# Patient Record
Sex: MALE | Race: WHITE | NOT HISPANIC OR LATINO | Employment: OTHER | URBAN - METROPOLITAN AREA
[De-identification: names, ages, dates, MRNs, and addresses within clinical notes are randomized per-mention and may not be internally consistent; named-entity substitution may affect disease eponyms.]

---

## 2022-10-15 ENCOUNTER — HOSPITAL ENCOUNTER (INPATIENT)
Facility: HOSPITAL | Age: 82
LOS: 1 days | Discharge: HOME | DRG: 084 | End: 2022-10-16
Attending: STUDENT IN AN ORGANIZED HEALTH CARE EDUCATION/TRAINING PROGRAM | Admitting: SURGERY
Payer: MEDICARE

## 2022-10-15 DIAGNOSIS — S06.5XAA SDH (SUBDURAL HEMATOMA): ICD-10-CM

## 2022-10-15 DIAGNOSIS — S06.2X9A: ICD-10-CM

## 2022-10-15 DIAGNOSIS — S09.90XA INJURY OF HEAD, INITIAL ENCOUNTER: Primary | ICD-10-CM

## 2022-10-15 LAB
ALBUMIN SERPL-MCNC: 4.3 GM/DL (ref 3.4–4.8)
ALBUMIN/GLOB SERPL: 1.1 RATIO (ref 1.1–2)
ALP SERPL-CCNC: 95 UNIT/L (ref 40–150)
ALT SERPL-CCNC: 22 UNIT/L (ref 0–55)
APTT PPP: 30.1 SECONDS (ref 23.2–33.7)
AST SERPL-CCNC: 29 UNIT/L (ref 5–34)
BASOPHILS # BLD AUTO: 0.02 X10(3)/MCL (ref 0–0.2)
BASOPHILS NFR BLD AUTO: 0.2 %
BILIRUBIN DIRECT+TOT PNL SERPL-MCNC: 1.1 MG/DL
BUN SERPL-MCNC: 22 MG/DL (ref 8.4–25.7)
CALCIUM SERPL-MCNC: 10.6 MG/DL (ref 8.8–10)
CHLORIDE SERPL-SCNC: 105 MMOL/L (ref 98–107)
CO2 SERPL-SCNC: 22 MMOL/L (ref 23–31)
CREAT SERPL-MCNC: 1.09 MG/DL (ref 0.73–1.18)
EOSINOPHIL # BLD AUTO: 0 X10(3)/MCL (ref 0–0.9)
EOSINOPHIL NFR BLD AUTO: 0 %
ERYTHROCYTE [DISTWIDTH] IN BLOOD BY AUTOMATED COUNT: 13.9 % (ref 11.5–17)
GFR SERPLBLD CREATININE-BSD FMLA CKD-EPI: >60 MLS/MIN/1.73/M2
GLOBULIN SER-MCNC: 3.9 GM/DL (ref 2.4–3.5)
GLUCOSE SERPL-MCNC: 149 MG/DL (ref 82–115)
HCT VFR BLD AUTO: 42.9 % (ref 42–52)
HGB BLD-MCNC: 14.2 GM/DL (ref 14–18)
IMM GRANULOCYTES # BLD AUTO: 0.03 X10(3)/MCL (ref 0–0.04)
IMM GRANULOCYTES NFR BLD AUTO: 0.3 %
INR BLD: 0.98 (ref 0–1.3)
LYMPHOCYTES # BLD AUTO: 0.55 X10(3)/MCL (ref 0.6–4.6)
LYMPHOCYTES NFR BLD AUTO: 5.5 %
MCH RBC QN AUTO: 32.6 PG (ref 27–31)
MCHC RBC AUTO-ENTMCNC: 33.1 MG/DL (ref 33–36)
MCV RBC AUTO: 98.6 FL (ref 80–94)
MONOCYTES # BLD AUTO: 0.58 X10(3)/MCL (ref 0.1–1.3)
MONOCYTES NFR BLD AUTO: 5.8 %
NEUTROPHILS # BLD AUTO: 8.8 X10(3)/MCL (ref 2.1–9.2)
NEUTROPHILS NFR BLD AUTO: 88.2 %
NRBC BLD AUTO-RTO: 0 %
PLATELET # BLD AUTO: 254 X10(3)/MCL (ref 130–400)
PMV BLD AUTO: 11.1 FL (ref 7.4–10.4)
POTASSIUM SERPL-SCNC: 4.5 MMOL/L (ref 3.5–5.1)
PROT SERPL-MCNC: 8.2 GM/DL (ref 5.8–7.6)
PROTHROMBIN TIME: 12.9 SECONDS (ref 12.5–14.5)
RBC # BLD AUTO: 4.35 X10(6)/MCL (ref 4.7–6.1)
SODIUM SERPL-SCNC: 140 MMOL/L (ref 136–145)
WBC # SPEC AUTO: 10 X10(3)/MCL (ref 4.5–11.5)

## 2022-10-15 PROCEDURE — 85025 COMPLETE CBC W/AUTO DIFF WBC: CPT | Performed by: STUDENT IN AN ORGANIZED HEALTH CARE EDUCATION/TRAINING PROGRAM

## 2022-10-15 PROCEDURE — 20000000 HC ICU ROOM

## 2022-10-15 PROCEDURE — 85610 PROTHROMBIN TIME: CPT | Performed by: STUDENT IN AN ORGANIZED HEALTH CARE EDUCATION/TRAINING PROGRAM

## 2022-10-15 PROCEDURE — 96374 THER/PROPH/DIAG INJ IV PUSH: CPT

## 2022-10-15 PROCEDURE — 25000003 PHARM REV CODE 250: Performed by: STUDENT IN AN ORGANIZED HEALTH CARE EDUCATION/TRAINING PROGRAM

## 2022-10-15 PROCEDURE — 85730 THROMBOPLASTIN TIME PARTIAL: CPT | Performed by: STUDENT IN AN ORGANIZED HEALTH CARE EDUCATION/TRAINING PROGRAM

## 2022-10-15 PROCEDURE — 99291 CRITICAL CARE FIRST HOUR: CPT | Mod: 25

## 2022-10-15 PROCEDURE — 80053 COMPREHEN METABOLIC PANEL: CPT | Performed by: STUDENT IN AN ORGANIZED HEALTH CARE EDUCATION/TRAINING PROGRAM

## 2022-10-15 PROCEDURE — 63600175 PHARM REV CODE 636 W HCPCS: Performed by: STUDENT IN AN ORGANIZED HEALTH CARE EDUCATION/TRAINING PROGRAM

## 2022-10-15 PROCEDURE — 36415 COLL VENOUS BLD VENIPUNCTURE: CPT | Performed by: STUDENT IN AN ORGANIZED HEALTH CARE EDUCATION/TRAINING PROGRAM

## 2022-10-15 RX ORDER — ASPIRIN 81 MG/1
81 TABLET ORAL
Status: ON HOLD | COMMUNITY
End: 2022-10-16 | Stop reason: SDUPTHER

## 2022-10-15 RX ORDER — ALLOPURINOL 300 MG/1
TABLET ORAL
COMMUNITY
Start: 2022-09-09

## 2022-10-15 RX ORDER — AMLODIPINE BESYLATE 5 MG/1
10 TABLET ORAL DAILY
Status: DISCONTINUED | OUTPATIENT
Start: 2022-10-16 | End: 2022-10-16 | Stop reason: HOSPADM

## 2022-10-15 RX ORDER — BENAZEPRIL HYDROCHLORIDE 40 MG/1
40 TABLET ORAL DAILY
COMMUNITY
Start: 2022-09-02

## 2022-10-15 RX ORDER — ATORVASTATIN CALCIUM 40 MG/1
40 TABLET, FILM COATED ORAL DAILY
Status: DISCONTINUED | OUTPATIENT
Start: 2022-10-16 | End: 2022-10-16 | Stop reason: HOSPADM

## 2022-10-15 RX ORDER — LABETALOL HYDROCHLORIDE 5 MG/ML
20 INJECTION, SOLUTION INTRAVENOUS
Status: COMPLETED | OUTPATIENT
Start: 2022-10-15 | End: 2022-10-15

## 2022-10-15 RX ORDER — FERROUS SULFATE, DRIED 160(50) MG
1 TABLET, EXTENDED RELEASE ORAL DAILY
COMMUNITY

## 2022-10-15 RX ORDER — INDOMETHACIN 25 MG/1
50 CAPSULE ORAL
COMMUNITY

## 2022-10-15 RX ORDER — LEVETIRACETAM 500 MG/1
500 TABLET ORAL 2 TIMES DAILY
Status: DISCONTINUED | OUTPATIENT
Start: 2022-10-16 | End: 2022-10-16 | Stop reason: HOSPADM

## 2022-10-15 RX ORDER — AMLODIPINE BESYLATE 10 MG/1
10 TABLET ORAL DAILY
COMMUNITY
Start: 2022-09-02

## 2022-10-15 RX ORDER — LEVETIRACETAM 10 MG/ML
1000 INJECTION INTRAVASCULAR ONCE
Status: COMPLETED | OUTPATIENT
Start: 2022-10-15 | End: 2022-10-15

## 2022-10-15 RX ORDER — LABETALOL HYDROCHLORIDE 5 MG/ML
10 INJECTION, SOLUTION INTRAVENOUS EVERY 6 HOURS PRN
Status: DISCONTINUED | OUTPATIENT
Start: 2022-10-15 | End: 2022-10-16 | Stop reason: HOSPADM

## 2022-10-15 RX ORDER — HYDRALAZINE HYDROCHLORIDE 20 MG/ML
10 INJECTION INTRAMUSCULAR; INTRAVENOUS EVERY 6 HOURS PRN
Status: DISCONTINUED | OUTPATIENT
Start: 2022-10-15 | End: 2022-10-16 | Stop reason: HOSPADM

## 2022-10-15 RX ORDER — ALLOPURINOL 100 MG/1
300 TABLET ORAL DAILY
Status: DISCONTINUED | OUTPATIENT
Start: 2022-10-16 | End: 2022-10-16 | Stop reason: HOSPADM

## 2022-10-15 RX ORDER — SODIUM CHLORIDE 0.9 % (FLUSH) 0.9 %
10 SYRINGE (ML) INJECTION
Status: DISCONTINUED | OUTPATIENT
Start: 2022-10-15 | End: 2022-10-16 | Stop reason: HOSPADM

## 2022-10-15 RX ORDER — LISINOPRIL 20 MG/1
20 TABLET ORAL DAILY
Status: DISCONTINUED | OUTPATIENT
Start: 2022-10-16 | End: 2022-10-16 | Stop reason: HOSPADM

## 2022-10-15 RX ORDER — FAMOTIDINE 20 MG/1
20 TABLET, FILM COATED ORAL 2 TIMES DAILY
Status: DISCONTINUED | OUTPATIENT
Start: 2022-10-15 | End: 2022-10-16 | Stop reason: HOSPADM

## 2022-10-15 RX ORDER — ONDANSETRON 4 MG/1
8 TABLET, ORALLY DISINTEGRATING ORAL EVERY 8 HOURS PRN
Status: DISCONTINUED | OUTPATIENT
Start: 2022-10-15 | End: 2022-10-16 | Stop reason: HOSPADM

## 2022-10-15 RX ORDER — ATORVASTATIN CALCIUM 40 MG/1
40 TABLET, FILM COATED ORAL DAILY
COMMUNITY
Start: 2022-09-02

## 2022-10-15 RX ORDER — ACETAMINOPHEN 325 MG/1
650 TABLET ORAL EVERY 4 HOURS PRN
Status: DISCONTINUED | OUTPATIENT
Start: 2022-10-15 | End: 2022-10-16 | Stop reason: HOSPADM

## 2022-10-15 RX ORDER — OXYCODONE HYDROCHLORIDE 5 MG/1
10 TABLET ORAL EVERY 4 HOURS PRN
Status: DISCONTINUED | OUTPATIENT
Start: 2022-10-15 | End: 2022-10-16 | Stop reason: HOSPADM

## 2022-10-15 RX ORDER — OXYCODONE HYDROCHLORIDE 5 MG/1
5 TABLET ORAL EVERY 4 HOURS PRN
Status: DISCONTINUED | OUTPATIENT
Start: 2022-10-15 | End: 2022-10-16 | Stop reason: HOSPADM

## 2022-10-15 RX ORDER — TALC
6 POWDER (GRAM) TOPICAL NIGHTLY PRN
Status: DISCONTINUED | OUTPATIENT
Start: 2022-10-15 | End: 2022-10-16 | Stop reason: HOSPADM

## 2022-10-15 RX ADMIN — LABETALOL HYDROCHLORIDE 20 MG: 5 INJECTION, SOLUTION INTRAVENOUS at 08:10

## 2022-10-15 RX ADMIN — FAMOTIDINE 20 MG: 20 TABLET, FILM COATED ORAL at 09:10

## 2022-10-15 RX ADMIN — LEVETIRACETAM 1000 MG: 10 INJECTION INTRAVENOUS at 09:10

## 2022-10-16 VITALS
HEIGHT: 66 IN | BODY MASS INDEX: 30.72 KG/M2 | RESPIRATION RATE: 14 BRPM | HEART RATE: 76 BPM | TEMPERATURE: 98 F | OXYGEN SATURATION: 95 % | WEIGHT: 191.13 LBS | SYSTOLIC BLOOD PRESSURE: 141 MMHG | DIASTOLIC BLOOD PRESSURE: 63 MMHG

## 2022-10-16 PROBLEM — S06.5XAA SDH (SUBDURAL HEMATOMA): Status: ACTIVE | Noted: 2022-10-16

## 2022-10-16 LAB
ANION GAP SERPL CALC-SCNC: 13 MEQ/L
BASOPHILS # BLD AUTO: 0.01 X10(3)/MCL (ref 0–0.2)
BASOPHILS NFR BLD AUTO: 0.1 %
BUN SERPL-MCNC: 23.3 MG/DL (ref 8.4–25.7)
CALCIUM SERPL-MCNC: 9.8 MG/DL (ref 8.8–10)
CHLORIDE SERPL-SCNC: 103 MMOL/L (ref 98–107)
CO2 SERPL-SCNC: 22 MMOL/L (ref 23–31)
CREAT SERPL-MCNC: 1 MG/DL (ref 0.73–1.18)
CREAT/UREA NIT SERPL: 23
EOSINOPHIL # BLD AUTO: 0 X10(3)/MCL (ref 0–0.9)
EOSINOPHIL NFR BLD AUTO: 0 %
ERYTHROCYTE [DISTWIDTH] IN BLOOD BY AUTOMATED COUNT: 13.8 % (ref 11.5–17)
GFR SERPLBLD CREATININE-BSD FMLA CKD-EPI: >60 MLS/MIN/1.73/M2
GLUCOSE SERPL-MCNC: 118 MG/DL (ref 82–115)
HCT VFR BLD AUTO: 39.2 % (ref 42–52)
HGB BLD-MCNC: 13 GM/DL (ref 14–18)
IMM GRANULOCYTES # BLD AUTO: 0.03 X10(3)/MCL (ref 0–0.04)
IMM GRANULOCYTES NFR BLD AUTO: 0.4 %
LYMPHOCYTES # BLD AUTO: 0.68 X10(3)/MCL (ref 0.6–4.6)
LYMPHOCYTES NFR BLD AUTO: 8 %
MCH RBC QN AUTO: 33 PG (ref 27–31)
MCHC RBC AUTO-ENTMCNC: 33.2 MG/DL (ref 33–36)
MCV RBC AUTO: 99.5 FL (ref 80–94)
MONOCYTES # BLD AUTO: 0.77 X10(3)/MCL (ref 0.1–1.3)
MONOCYTES NFR BLD AUTO: 9.1 %
NEUTROPHILS # BLD AUTO: 7 X10(3)/MCL (ref 2.1–9.2)
NEUTROPHILS NFR BLD AUTO: 82.4 %
NRBC BLD AUTO-RTO: 0 %
PLATELET # BLD AUTO: 220 X10(3)/MCL (ref 130–400)
PMV BLD AUTO: 11.4 FL (ref 7.4–10.4)
POTASSIUM SERPL-SCNC: 4.6 MMOL/L (ref 3.5–5.1)
RBC # BLD AUTO: 3.94 X10(6)/MCL (ref 4.7–6.1)
SODIUM SERPL-SCNC: 138 MMOL/L (ref 136–145)
WBC # SPEC AUTO: 8.5 X10(3)/MCL (ref 4.5–11.5)

## 2022-10-16 PROCEDURE — 25000003 PHARM REV CODE 250: Performed by: STUDENT IN AN ORGANIZED HEALTH CARE EDUCATION/TRAINING PROGRAM

## 2022-10-16 PROCEDURE — 36415 COLL VENOUS BLD VENIPUNCTURE: CPT | Performed by: STUDENT IN AN ORGANIZED HEALTH CARE EDUCATION/TRAINING PROGRAM

## 2022-10-16 PROCEDURE — 85025 COMPLETE CBC W/AUTO DIFF WBC: CPT | Performed by: STUDENT IN AN ORGANIZED HEALTH CARE EDUCATION/TRAINING PROGRAM

## 2022-10-16 PROCEDURE — 80048 BASIC METABOLIC PNL TOTAL CA: CPT | Performed by: STUDENT IN AN ORGANIZED HEALTH CARE EDUCATION/TRAINING PROGRAM

## 2022-10-16 RX ORDER — ASPIRIN 81 MG/1
81 TABLET ORAL DAILY
Refills: 0 | COMMUNITY
Start: 2022-10-23

## 2022-10-16 RX ADMIN — LEVETIRACETAM 500 MG: 500 TABLET, FILM COATED ORAL at 08:10

## 2022-10-16 RX ADMIN — AMLODIPINE BESYLATE 10 MG: 5 TABLET ORAL at 08:10

## 2022-10-16 RX ADMIN — ATORVASTATIN CALCIUM 40 MG: 40 TABLET, FILM COATED ORAL at 08:10

## 2022-10-16 RX ADMIN — LISINOPRIL 20 MG: 20 TABLET ORAL at 08:10

## 2022-10-16 RX ADMIN — FAMOTIDINE 20 MG: 20 TABLET, FILM COATED ORAL at 08:10

## 2022-10-16 RX ADMIN — ALLOPURINOL 300 MG: 100 TABLET ORAL at 08:10

## 2022-10-16 NOTE — DISCHARGE SUMMARY
[facility] - [facility] [address] [facility]  General Surgery  Discharge Summary      Patient Name: Lazaro Saeed  MRN: [id number]  Admission Date: 10/15/2022  Hospital Length of Stay: 1 days  Discharge Date and Time:  10/16/2022 12:31 PM  Attending Physician: Joe Martinez Jr., MD   Discharging Provider: Marii Hoffman MD  Primary Care Provider: No primary care provider on file.     HPI: 81M with hx HTN, HLD, gout, prostate CA presented as a transfer from OSH after fall from 6 foot ladder.     * No surgery found *     Hospital Course: Patient admitted to TICU 10/15 for close monitoring of SDH seen on CT in ED. Patient was cleared for discharge by neurosurgery on 10/16. He is neurologically intact with no headache. Discharged with no need for follow up. Told to avoid ASA for 7 days.    Consults:   Consults (From admission, onward)          Status Ordering Provider     Inpatient consult to Neurosurgery  Once        Provider:  Bhupinder Hernandez MD    Completed JOE MARTINEZ JR            Significant Diagnostic Studies: Radiology: CT scan:  CT Head    Pending Diagnostic Studies:       None          Final Active Diagnoses:    Diagnosis Date Noted POA    PRINCIPAL PROBLEM:  SDH (subdural hematoma) [S06.5XAA] 10/16/2022 Unknown      Problems Resolved During this Admission:      Discharged Condition: good    Disposition: Home or Self Care    Follow Up:    Patient Instructions:      Notify your health care provider if you experience any of the following:  temperature >100.4     Notify your health care provider if you experience any of the following:  persistent nausea and vomiting or diarrhea     Notify your health care provider if you experience any of the following:  severe uncontrolled pain     Medications:  Reconciled Home Medications:      Medication List        CHANGE how you take these medications      aspirin 81 MG EC tablet  Commonly known as: ECOTRIN  Take 1 tablet (81 mg total) by mouth once daily.  Start taking  on: October 23, 2022  What changed:   when to take this  These instructions start on October 23, 2022. If you are unsure what to do until then, ask your doctor or other care provider.            CONTINUE taking these medications      allopurinoL 300 MG tablet  Commonly known as: ZYLOPRIM     amLODIPine 10 MG tablet  Commonly known as: NORVASC  Take 10 mg by mouth once daily.     atorvastatin 40 MG tablet  Commonly known as: LIPITOR  Take 40 mg by mouth once daily.     benazepriL 40 MG tablet  Commonly known as: LOTENSIN  Take 40 mg by mouth once daily.     calcium-vitamin D3 500 mg-5 mcg (200 unit) per tablet  Commonly known as: OS-THANG 500 + D3  Take 1 tablet by mouth once daily.     indomethacin 25 MG capsule  Commonly known as: INDOCIN  Take 50 mg by mouth as needed.     Lactobacillus acidophilus 1 billion cell Cap  Take 1 tablet by mouth once daily.     multivitamin capsule  Take 1 capsule by mouth once daily.              Marii Hoffman MD  General Surgery  [facility] - [address] [place] [address] ICU

## 2022-10-16 NOTE — H&P
Trauma/Acute Care Surgery  Inpatient Consult     Subjective:     CC: Fall from ladder    HPI: Lazaro Saeed is a 80 yo M who presented to [facility] as a transfer from an OSH, where he initially presented for evaluation after he fell off of a 6 foot ladder. He does not remember having any dizzines, lightheadedness, or palpitation prior to the floor. He thinks the ladder buckled underneath him. He denies LOC, but does not remember the whole accident. He does not remember how he landed. He has been ambulatory since the accident. He has had a GCS of 15 since the accident. He is currently c/o head and L knee pain.      PMH: HTN, HLD, gout, prostate cancer s/p XRT  PSH: Prostatectomy, retinal reattachment, cataract extraction  Home Meds: Norvasc, benazepril, Lipitor, ASA, allopurinol  Allergies: NKDA   Social Hx: Never smoker, 2 EtOH drinks/day, denies illicit drug use  Relevant Family Hx: None     Objective:     Vitals:  BP: (!) 188/94   Pulse: 108   Resp: (!) 22   Temp: 97.5 °F (36.4 °C)      Physical Exam:  Gen: NAD  Neuro: awake, alert, answering questions appropriately  HEENT: NCAT  CV: RRR  Resp: non-labored breathing, stabl on 2L O2 via NC  Abd: soft, ND, NT  Ext: moves all 4 spontaneously and purposefully  Skin: warm, well perfused     Labs:  WBC 10.0  Hgb 14.2  Plt 254  INR 0.98  Cr 1.09  AST/ALT 29/22  Glucose 149     Imaging:  OSH CTH:   Focal area of intraparenchymal hemorrhage in the Sylvian fissure anteriorly and inferiorly adjacent to the temporal lobe.   Questionable SDH overlying the R frontal region, although this may be artifact.    OSH CT C-Spine:   Severe multilevel degenerative changes.    No evidence of acute fracture, subluxation, or facet dislocation.     Assessment/Plan:  Lazaro Saeed is a 80 yo M s/p fall form 6 ft w/ an acute SDH.     Neuro:  - NSGY consulted for intracranial hemorrhages. Will f/u recs.   - Q1 neuro checks.   - Repeat CTH in AM, sooner if any neuro changes develop.   - Keppra x7d for  seizure PPX. Seizure precautions.   - PRN analgesia available.     CV:  - SBP goal <140 per NSGY. Currently above goal. Restart home Novasc, ACE (-). PRN hydralazine and labetalol IV pushes available. If still not maintaining goal w/ PRNs, will start Cardene gtt.     Resp:  - Wean supplemental O2 to maintain sats >92%. Encourage IS, deep breathing, coughing.     FEN/GI:  - NPO until stable CTH.   - mIVFs (NS @ 125 cc/hr) and H2B for stress ulcer PPX while NPO.   - Replace lytes PRN based on daily labs.     Renal:  - Cr WNL.   - Monitor UOP.     Heme:  - Hgb and Plt WNL. No concern for bleeding or indication for transfusion.    - SCDs for DVT PPX. Holding chemoprophylaxis until cleared by NSGY in setting of intracranial hemorrhages.    ID:  - AF. WBC WNL. No concern for infection or indication for antibiotics at this time.    Endo:  - No known h/o DM. BG goal 100-180. Currently w/ no insulin requirement CTM.     MSK:  - C/o L knee pain. Will obtain XR to r/o fx.  - H/o gout. Continue home allopurinol.   - OOB, ambulation as tolerated.     Dispo:  - Admit to TICU.     Alexis Scheuermann, MD   [facility] General Surgery, PGY4  10/15/2022 8:42 PM

## 2022-10-16 NOTE — TERTIARY TRAUMA SURVEY NOTE
TERTIARY TRAUMA SURVEY     HPI/Brief Hospital Course: Lazaro Saeed is a 82 yo M who presented to [facility] as a transfer from an OSH, where he initially presented for evaluation after falling from a 6 foot ladder. He does not remember having any dizzines, lightheadedness, or palpitations prior to the fall. He denies LOC, but does not remember the whole accident. Imaging workup revealed intracranial hemorrhages, details listed below. Per NSGY, he was admitted to the TICU for close monitoring. He has remained oriented and neurologically intact since presentation.     Injuries Identified to Date:  Intraparenchymal hemorrhagic contusions  Intraventricular hemorrhage    Operations and Procedures:  None    Past Medical/Surgical History:  HTN (on Norvasc, benazepril)  HLD (on Lipitor)  Gout (an allopurinol)  Prostate cancer s/p XRT and prostatectomy  Retinal re-attachment  Cataract extraction    Physical Exam:  BP: 135/71   Pulse: 85   Resp: 15   Temp: 98.3 °F (36.8 °C)      Gen: NAD  Neuro: awake, alert, answering questions appropriately  HEENT: NCAT  CV: RRR  Resp: non-labored breathing, stable on 2L O2 via NC  Abd: soft, ND, NT  : deferred  Rectal: deferred  Ext: moves all 4 spontaneously and purposefully w/ 5/5 strength throughout BUE and BLE  Skin: warm, well perfused    Imaging Review:  OSH CTH:   Focal area of intraparenchymal hemorrhage in the Sylvian fissure anteriorly and inferiorly adjacent to the temporal lobe.   Questionable SDH overlying the R frontal region, although this may be artifact.     OSH CT C-Spine:   Severe multilevel degenerative changes.    No evidence of acute fracture, subluxation, or facet dislocation.    XR L Knee: Osteoarthritis. No fracture or dislocation.     Repeat CTH (Preliminary):   Stable intraparenchymal hemorrhage.  Interval development of intraventricular hemorrhage in the occipital horns of the lateral ventricles.     Lab Review:  WBC 8.5  Hgb 13.0  Plt 220  INR 0.98  Cr  1.00  AST/ALT 29/22  Glucose 118    Assessment/Plan:  Lazaro Saeed is a 80 yo M s/p fall form 6 ft w/ an acute SDH.      Neuro:  - Repeat CTH reviewed this AM. New IVH appreciated. Patient aware. Remains GCS 15 w/ no neuro deficits.  - NSGY consulted for eval of intracranial hemorrhages. Will f/u recs.   - For now, continue Q1 neuro checks.    - Keppra x7d for seizure PPX. Seizure precautions.   - PRN analgesia available.      CV:  - SBP goal <140 per NSGY. Continue home Novasc, ACE (-). PRN hydralazine and labetalol IV pushes available. If still not maintaining goal w/ PRNs, will start Cardene gtt.      Resp:  - Wean supplemental O2 to maintain sats >92%. Encourage IS, deep breathing, coughing.      FEN/GI:  - NPO pending NSGY evaluation.  - mIVFs (NS @ 125 cc/hr) and H2B for stress ulcer PPX while NPO.   - Replace lytes PRN based on daily labs.      Renal:  - Voiding spontaneously. Cr WNL. No acute concerns.      Heme:  - Slight decrease in Hgb, not unexpected given IVF resuscitation. Plt WNL. No concern for bleeding or indication for transfusion.    - SCDs for DVT PPX. Holding chemoprophylaxis until cleared by NSGY in setting of intracranial hemorrhages.     ID:  - AF. WBC WNL. No concern for infection or indication for antibiotics at this time.     Endo:  - No known h/o DM. BG goal 100-180. Currently w/ no insulin requirement CTM.      MSK:  - H/o gout. Continue home allopurinol.   - OOB, ambulation as tolerated.      Dispo:  - Continue ICU level care until deemed stable for Q2 neuro checks by NSGY. No other ICU requirements at this time.     Alexis Scheuermann, MD   [facility] General Surgery, PGY4  10/16/2022 7:07 AM

## 2022-10-16 NOTE — ED PROVIDER NOTES
Encounter Date: 10/15/2022    SCRIBE #1 NOTE: I, Isaias Baez, am scribing for, and in the presence of,  Florencio Arvizu IV, MD. I have scribed the following portions of the note - Other sections scribed: HPI, ROS, PE.     History     Chief Complaint   Patient presents with    Head Injury     Transfer from [facility], Pt fell from ladder approx 5 ft,  head bleed. Pt is AAO x 4, no apparent distress.      82 y/o male with a history of HTN, HLD, and prostate cancer presents to the ED as a transfer from [facility] after falling 5ft off of a ladder and hitting the back of his head. Pt reports head pain and left knee pain but denies back pain.    Transfer summary: Pt was diagnosed with an intracranial hemorrhage after the fall. Per ER note from [facility], pt was alert and oriented x 3 and had a CGS of 15. The performed CT scan showed a 1.1x1.4 subdural hematoma. He was transferred to [facility] ED for neurosurgery services.    The history is provided by the patient and medical records. No  was used.   Head Injury   The incident occurred 3 to 5 hours ago. He came to the ER via by ambulance. The injury mechanism was a fall. There was no blood loss. The pain has been constant since the injury. Pertinent negatives include no vomiting and no weakness.   Review of patient's allergies indicates:  No Known Allergies  History reviewed. No pertinent past medical history.  History reviewed. No pertinent surgical history.  History reviewed. No pertinent family history.     Review of Systems   Constitutional:  Negative for chills and fever.   HENT:  Negative for congestion, rhinorrhea and sore throat.         Head pain   Eyes:  Negative for visual disturbance.   Respiratory:  Negative for cough and shortness of breath.    Cardiovascular:  Negative for chest pain.   Gastrointestinal:  Negative for abdominal pain, nausea and vomiting.   Genitourinary:  Negative for dysuria and hematuria.    Musculoskeletal:  Negative for joint swelling.        Left knee pain   Skin:  Negative for rash.   Neurological:  Negative for weakness.   Psychiatric/Behavioral:  Negative for confusion.    All other systems reviewed and are negative.    Physical Exam     Initial Vitals [10/15/22 1937]   BP Pulse Resp Temp SpO2   (!) 161/86 110 16 97.5 °F (36.4 °C) 98 %      MAP       --         Physical Exam    Nursing note and vitals reviewed.  Constitutional: He is not diaphoretic. No distress.   HENT:   Head: Normocephalic.   Hematoma to left posterior scalp   Eyes: EOM are normal. Pupils are equal, round, and reactive to light.   Neck: Neck supple.   Normal range of motion.  Cardiovascular:  Normal rate and regular rhythm.           No murmur heard.  Pulmonary/Chest: Breath sounds normal. No respiratory distress. He has no wheezes. He has no rales.   Abdominal: Abdomen is soft. He exhibits no distension. There is no abdominal tenderness.   Musculoskeletal:         General: No edema. Normal range of motion.      Cervical back: Normal range of motion and neck supple.     Neurological: He is alert and oriented to person, place, and time. He has normal strength. No cranial nerve deficit.   Skin: Skin is warm. No rash noted.   Psychiatric: He has a normal mood and affect.       ED Course   Critical Care    Date/Time: 10/15/2022 11:11 PM  Performed by: Florencio Arvizu IV, MD  Authorized by: Florencio Arvizu IV, MD   Total critical care time (exclusive of procedural time) : 35 minutes  Critical care time was exclusive of separately billable procedures and treating other patients.  Critical care was necessary to treat or prevent imminent or life-threatening deterioration of the following conditions: CNS failure or compromise.  Critical care was time spent personally by me on the following activities: blood draw for specimens, development of treatment plan with patient or surrogate, discussions with consultants, evaluation of patient's  response to treatment, examination of patient, obtaining history from patient or surrogate, ordering and performing treatments and interventions, ordering and review of laboratory studies, re-evaluation of patient's condition, pulse oximetry, ordering and review of radiographic studies and review of old charts.      Labs Reviewed   COMPREHENSIVE METABOLIC PANEL - Abnormal; Notable for the following components:       Result Value    Carbon Dioxide 22 (*)     Glucose Level 149 (*)     Calcium Level Total 10.6 (*)     Protein Total 8.2 (*)     Globulin 3.9 (*)     All other components within normal limits   CBC WITH DIFFERENTIAL - Abnormal; Notable for the following components:    RBC 4.35 (*)     MCV 98.6 (*)     MCH 32.6 (*)     MPV 11.1 (*)     Lymph # 0.55 (*)     All other components within normal limits   PROTIME-INR - Normal   APTT - Normal   CBC W/ AUTO DIFFERENTIAL    Narrative:     The following orders were created for panel order CBC auto differential.  Procedure                               Abnormality         Status                     ---------                               -----------         ------                     CBC with Differential[[id number]]        Abnormal            Final result                 Please view results for these tests on the individual orders.          Imaging Results              X-Ray Knee Complete 4 or More Views Left (Final result)  Result time 10/15/22 22:21:48   Procedure changed from X-Ray Knee 3 View Left     Final result by Baryan Borrero MD (10/15/22 22:21:48)                   Impression:      Moderate tricompartmental osteoarthritis of the left knee.      Electronically signed by: Brayan Borrero MD  Date:    10/15/2022  Time:    22:21               Narrative:    EXAMINATION:  Four radiographic views of the KNEE.    CLINICAL HISTORY:  Trauma;    TECHNIQUE:  Four radiographic views of the KNEE    COMPARISON:  None.    FINDINGS:  Four views of the left knee demonstrate  moderate tricompartmental osteoarthritis.  There is no fracture.  There is no joint effusion.  There is no soft tissue swelling.                                    X-Rays:   Independently Interpreted Readings:   Head CT: R small extraaxial hemorrhage    Medications   sodium chloride 0.9% flush 10 mL (has no administration in time range)   ondansetron disintegrating tablet 8 mg (has no administration in time range)   melatonin tablet 6 mg (has no administration in time range)   acetaminophen tablet 650 mg (has no administration in time range)   oxyCODONE immediate release tablet 5 mg (has no administration in time range)   oxyCODONE immediate release tablet 10 mg (has no administration in time range)   levETIRAcetam tablet 500 mg (has no administration in time range)   hydrALAZINE injection 10 mg (has no administration in time range)   labetaloL injection 10 mg (has no administration in time range)   famotidine tablet 20 mg (20 mg Oral Given 10/15/22 2152)   amLODIPine tablet 10 mg (has no administration in time range)   atorvastatin tablet 40 mg (has no administration in time range)   allopurinoL tablet 300 mg (has no administration in time range)   lisinopriL tablet 20 mg (has no administration in time range)   labetaloL injection 20 mg (20 mg Intravenous Given 10/15/22 2030)   levETIRAcetam in NaCl (iso-os) IVPB 1,000 mg (1,000 mg Intravenous New Bag 10/15/22 2152)     Medical Decision Making:   History:   Old Medical Records: I decided to obtain old medical records.  Old Records Summarized: records from another hospital.  Initial Assessment:   81-year-old transfer from outside hospital for right small traumatic ICH after fall 6 ft from a ladder - small extra-axial bleed and subdural hematoma on the right no midline shift.  Patient GCS 15 awake alert no neuro deficits on exam.  Slightly hypertensive not on any anticoagulation.  Discussed with nurse surgery will admit to trauma ICU blood pressure control Q 1 neuro  checks repeat CT head in the morning  Independently Interpreted Test(s):   I have ordered and independently interpreted X-rays - see prior notes.  Clinical Tests:   Lab Tests: Ordered and Reviewed  Radiological Study: Reviewed and Ordered  ED Management:  IV labetalol   Other:   I have discussed this case with another health care provider.       <> Summary of the Discussion: Neurosurgery Dr. Hernandez  Trauma surgery         Scribe Attestation:   Scribe #1: I performed the above scribed service and the documentation accurately describes the services I performed. I attest to the accuracy of the note.    Attending Attestation:           Physician Attestation for Scribe:  Physician Attestation Statement for Scribe #1: Florencio CALDERON IV, MD, reviewed documentation, as scribed by Isaias Baez in my presence, and it is both accurate and complete.           ED Course as of 10/15/22 2320   Sat Oct 15, 2022   2024 CT reviewed, will consult NSGY  [AC]   2024 Paged neurosurgery [JG]   2024 Paged trauma resident [JG]   2036 Trauma will admit the patient [JG]   2043 Neursurgery - recommends q1 neuro checks, BP<140/90, repeat head CT in am    [AC]      ED Course User Index  [AC] Florencio Arvizu IV, MD  [JG] Isaias Baez                 Clinical Impression:   Final diagnoses:  [S06.5XAA] SDH (subdural hematoma)  [S09.90XA] Injury of head, initial encounter (Primary)  [S06.2X9A] Intracranial hematoma following injury with loss of consciousness, initial encounter      ED Disposition Condition    Admit         Florencio CALDERON MD personally performed the history, PE, MDM, and procedures as documented above and agree with the scribe's documentation.           Florencio Arvizu IV, MD  10/15/22 6298

## 2022-10-16 NOTE — CONSULTS
Trauma/Acute Care Surgery  Inpatient Consult     Subjective:     CC: Fall from ladder    HPI: Lazaro Saeed is a 82 yo M who presented to [facility] as a transfer from an OSH, where he initially presented for evaluation after he fell off of a 6 foot ladder. He does not remember having any dizzines, lightheadedness, or palpitation prior to the floor. He thinks the ladder buckled underneath him. He denies LOC, but does not remember the whole accident. He does not remember how he landed. He has been ambulatory since the accident. He has had a GCS of 15 since the accident. He is currently c/o head and L knee pain.      PMH: HTN, HLD, gout, prostate cancer s/p XRT  PSH: Prostatectomy, retinal reattachment, cataract extraction  Home Meds: Norvasc, benazepril, Lipitor, ASA, allopurinol  Allergies: NKDA   Social Hx: Never smoker, 2 drinks/day, denies illicit drug use  Relevant Family Hx: None     Objective:     Vitals:  BP: (!) 188/94   Pulse: 108   Resp: (!) 22   Temp: 97.5 °F (36.4 °C)      Physical Exam:  Gen: NAD  Neuro: awake, alert, answering questions appropriately  HEENT: NCAT  CV: RRR  Resp: non-labored breathing, stabl on 2L O2 via NC  Abd: soft, ND, NT  Ext: moves all 4 spontaneously and purposefully  Skin: warm, well perfused     Labs:  WBC 10.0  Hgb 14.2  Plt 254  INR 0.98  Cr 1.09  AST/ALT 29/22  Glucose 149     Imaging:  OSH CTH:   Focal area of intraparenchymal hemorrhage in the Sylvian fissure anteriorly and inferiorly adjacent to the temporal lobe.   Questionable SDH overlying the R frontal region, although this may be artifact.    OSH CT C-Spine:   Severe multilevel degenerative changes.    No evidence of acute fracture, subluxation, or facet dislocation.     Assessment/Plan:  Lazaro Saeed is a 82 yo M s/p fall form 6 ft w/ an acute SDH. Plan to admit to TICU per NSGY. Full H&P w/ systems-based plan to follow.      Alexis Scheuermann, MD   [employer] General Surgery, PGY4  10/15/2022 8:42 PM

## 2022-10-16 NOTE — CONSULTS
[facility] - [facility] [place] [facility]  Neurosurgery  Consult Note    Inpatient consult to Neurosurgery  Consult performed by: ANTONIO Hernandez  Consult ordered by: Joe Martinez Jr., MD  Reason for consult: SDH      Subjective:     Chief Complaint/Reason for Admission: SDH    History of Present Illness: Patient is a 82 yo male with PMHx significant for HTN, HLD, gout, prostate CA, retinal re-attachment and cataract, who presented to [facility] on 10/15 as a transfer from an OSH, where he initially presented for evaluation after falling from a 6 foot ladder. He does not remember having any dizzines, lightheadedness, or palpitations prior to the fall. He denies LOC, but does not remember the whole accident. Imaging workup revealed SDH. Dr. Hernandez was consulted for evaluation and treatment recommendations. He was admitted to the TICU for close monitoring. He has remained oriented and neurologically intact since presentation.     On PE this am he is sitting up in bed, NAD. He currently denies HA, blurred vision and N/V. He is tolerating PO without issues and ambulating around the room.    PTA Medications   Medication Sig    allopurinoL (ZYLOPRIM) 300 MG tablet     amLODIPine (NORVASC) 10 MG tablet Take 10 mg by mouth once daily.    aspirin (ECOTRIN) 81 MG EC tablet Take 81 mg by mouth.    atorvastatin (LIPITOR) 40 MG tablet Take 40 mg by mouth once daily.    benazepriL (LOTENSIN) 40 MG tablet Take 40 mg by mouth once daily.    calcium-vitamin D3 (OS-THANG 500 + D3) 500 mg-5 mcg (200 unit) per tablet Take 1 tablet by mouth once daily.    indomethacin (INDOCIN) 25 MG capsule Take 50 mg by mouth as needed.    Lactobacillus acidophilus 1 billion cell Cap Take 1 tablet by mouth once daily.    multivitamin capsule Take 1 capsule by mouth once daily.       Review of patient's allergies indicates:  No Known Allergies    History reviewed. No pertinent past medical history.  History reviewed. No pertinent surgical  history.  Family History    None       Tobacco Use    Smoking status: Not on file    Smokeless tobacco: Not on file   Substance and Sexual Activity    Alcohol use: Not on file    Drug use: Not on file    Sexual activity: Not on file     Review of Systems  Objective:   12 pt ROS WNL, except for HPI    Weight: 86.7 kg (191 lb 2.2 oz)  Body mass index is 30.85 kg/m².  Vital Signs (Most Recent):  Temp: 98.1 °F (36.7 °C) (10/16/22 0800)  Pulse: 78 (10/16/22 1000)  Resp: 18 (10/16/22 1000)  BP: 112/75 (10/16/22 1000)  SpO2: 95 % (10/16/22 1000) Vital Signs (24h Range):  Temp:  [97.5 °F (36.4 °C)-98.3 °F (36.8 °C)] 98.1 °F (36.7 °C)  Pulse:  [] 78  Resp:  [12-28] 18  SpO2:  [94 %-99 %] 95 %  BP: (108-188)/(42-94) 112/75       Physical Exam:    Constitutional: He appears well-developed and well-nourished. No distress.     Eyes: Pupils are equal, round, and reactive to light. EOM are normal.     Cardiovascular: Normal rate, regular rhythm and intact distal pulses.     Abdominal: Soft. Bowel sounds are normal.     Psych/Behavior: He is alert. He is oriented to person, place, and time. He has a normal mood and affect.     Musculoskeletal:        Neck: Range of motion is full.        Back: Range of motion is full.        Right Upper Extremities: Range of motion is full. Muscle strength is 5/5.        Left Upper Extremities: Range of motion is full. Muscle strength is 5/5.       Right Lower Extremities: Range of motion is full. Muscle strength is 5/5.        Left Lower Extremities: Range of motion is full. Muscle strength is 5/5.     Neurological:        Cranial nerves: Cranial nerve(s) II, III, IV, V, VI, VII, VIII, IX, X, XI and XII are intact.     Significant Labs:  Recent Labs   Lab 10/15/22  2006 10/16/22  0135    138   K 4.5 4.6   CO2 22* 22*   BUN 22.0 23.3   CREATININE 1.09 1.00   CALCIUM 10.6* 9.8     Recent Labs   Lab 10/15/22  2006 10/16/22  0135   WBC 10.0 8.5   HGB 14.2 13.0*   HCT 42.9 39.2*     220     Recent Labs   Lab 10/15/22  2006   INR 0.98     Microbiology Results (last 7 days)       ** No results found for the last 168 hours. **            Significant Diagnostics:  CT Head Without Contrast [[id number]] Resulted: 10/16/22 0858   Order Status: Completed Updated: 10/16/22 0900   Narrative:     EXAMINATION:   CT HEAD WITHOUT CONTRAST     Technique: CT of the head was performed without intravenous contrast with axial as well as coronal and sagittal images.     Comparison: Comparison is with study dated 2022-10-15 12:33:32.     Dosage Information: Automated exposure control was utilized.     Clinical history: Recheck bleed.     Findings:     Hemorrhage: Again noted is a hemorrhagic contusion in the right presylvian cortex (series 2; image 18), which measures approximately 1.1 x 1 cm, unchanged since the prior examination. There is interval development of intraventricular hemorrhage in the occipital horns of the lateral ventricles (series 2; image 19). Again noted are small focal surface hemorrhagic contusions in the anterior frontal lobes (series 2; images 19 and 20), unchanged since the prior examination.     CSF spaces: The ventricles, sulci and basal cisterns all appear mildly prominent consistent with global cerebral atrophy.     Brain parenchyma: There is preservation of the grey white junction throughout. Mild microvascular change is seen in portions of the periventricular and deep white matter tracts.     Cerebellum: Unremarkable.     Vascular: Severe atheromatous calcification of the intracranial arteries is seen.     Sella and skull base: The sella appears to be within normal limits for age.     Herniation: None.     Intracranial calcifications: Incidental note is made of bilateral choroid plexus calcification. Incidental note is made of some pineal region calcification.     Calvarium: No acute linear or depressed skull fracture is seen.     Maxillofacial Structures:     Paranasal sinuses: There  is some mucoperiosteal thickening in the right frontal sinuses. The rest of the paranasal sinuses appear clear.     Orbits: The orbits appear unremarkable.     Zygomatic arches: The zygomatic arches are intact and unremarkable.     Temporal bones and mastoids: The temporal bones and mastoids appear unremarkable.     TMJ: The mandibular condyles appear normally placed with respect to the mandibular fossa.    Impression:     Impression:     1. Again noted is a hemorrhagic contusion in the right presylvian cortex (series 2; image 18), which measures approximately 1.1 x 1 cm, unchanged since the prior examination. There is interval development of intraventricular hemorrhage in the occipital horns of the lateral ventricles (series 2; image 19). Again noted are small focal surface hemorrhagic contusions in the anterior frontal lobes (series 2; images 19 and 20), unchanged since the prior examination.       Assessment/Plan:     He is neurologically intact. No HA at this time.  CT head this am reviewed by Dr Hernandez  He is doing well and is ok for DC  He was told to avoid ASA for 7 days  No f/u needed    Thank you for your consult.     ANTONIO Hernandez  Neurosurgery  [facility] - [address] [place] [address] ICU